# Patient Record
Sex: MALE | Race: WHITE
[De-identification: names, ages, dates, MRNs, and addresses within clinical notes are randomized per-mention and may not be internally consistent; named-entity substitution may affect disease eponyms.]

---

## 2018-07-23 ENCOUNTER — HOSPITAL ENCOUNTER (EMERGENCY)
Dept: HOSPITAL 84 - D.ER | Age: 63
Discharge: HOME | End: 2018-07-23
Payer: MEDICARE

## 2018-07-23 VITALS
WEIGHT: 180.38 LBS | WEIGHT: 180.38 LBS | BODY MASS INDEX: 26.72 KG/M2 | HEIGHT: 69 IN | HEIGHT: 69 IN | BODY MASS INDEX: 26.72 KG/M2

## 2018-07-23 VITALS — DIASTOLIC BLOOD PRESSURE: 69 MMHG | SYSTOLIC BLOOD PRESSURE: 127 MMHG

## 2018-07-23 DIAGNOSIS — I10: ICD-10-CM

## 2018-07-23 DIAGNOSIS — Z89.511: ICD-10-CM

## 2018-07-23 DIAGNOSIS — E11.621: Primary | ICD-10-CM

## 2018-07-23 DIAGNOSIS — L03.116: ICD-10-CM

## 2018-07-23 LAB
ALBUMIN SERPL-MCNC: 3.6 G/DL (ref 3.4–5)
ALP SERPL-CCNC: 86 U/L (ref 46–116)
ALT SERPL-CCNC: 33 U/L (ref 10–68)
ANION GAP SERPL CALC-SCNC: 8.7 MMOL/L (ref 8–16)
BASOPHILS NFR BLD AUTO: 0.5 % (ref 0–2)
BILIRUB SERPL-MCNC: 0.96 MG/DL (ref 0.2–1.3)
BUN SERPL-MCNC: 16 MG/DL (ref 7–18)
CALCIUM SERPL-MCNC: 9 MG/DL (ref 8.5–10.1)
CHLORIDE SERPL-SCNC: 101 MMOL/L (ref 98–107)
CO2 SERPL-SCNC: 31.4 MMOL/L (ref 21–32)
CREAT SERPL-MCNC: 1.2 MG/DL (ref 0.6–1.3)
EOSINOPHIL NFR BLD: 2 % (ref 0–7)
ERYTHROCYTE [DISTWIDTH] IN BLOOD BY AUTOMATED COUNT: 13.1 % (ref 11.5–14.5)
GLOBULIN SER-MCNC: 4.5 G/L
GLUCOSE SERPL-MCNC: 245 MG/DL (ref 74–106)
HCT VFR BLD CALC: 38.6 % (ref 42–54)
HGB BLD-MCNC: 13.9 G/DL (ref 13.5–17.5)
IMM GRANULOCYTES NFR BLD: 0.2 % (ref 0–5)
LYMPHOCYTES NFR BLD AUTO: 18 % (ref 15–50)
MCH RBC QN AUTO: 33.9 PG (ref 26–34)
MCHC RBC AUTO-ENTMCNC: 36 G/DL (ref 31–37)
MCV RBC: 94.1 FL (ref 80–100)
MONOCYTES NFR BLD: 6.8 % (ref 2–11)
NEUTROPHILS NFR BLD AUTO: 72.5 % (ref 40–80)
OSMOLALITY SERPL CALC.SUM OF ELEC: 282 MOSM/KG (ref 275–300)
PLATELET # BLD: 174 10X3/UL (ref 130–400)
PMV BLD AUTO: 11 FL (ref 7.4–10.4)
POTASSIUM SERPL-SCNC: 4.1 MMOL/L (ref 3.5–5.1)
PROT SERPL-MCNC: 8.1 G/DL (ref 6.4–8.2)
RBC # BLD AUTO: 4.1 10X6/UL (ref 4.2–6.1)
SODIUM SERPL-SCNC: 137 MMOL/L (ref 136–145)
WBC # BLD AUTO: 10.2 10X3/UL (ref 4.8–10.8)

## 2018-08-13 ENCOUNTER — HOSPITAL ENCOUNTER (INPATIENT)
Dept: HOSPITAL 84 - D.ER | Age: 63
LOS: 3 days | Discharge: HOME | DRG: 253 | End: 2018-08-16
Attending: INTERNAL MEDICINE | Admitting: INTERNAL MEDICINE
Payer: MEDICARE

## 2018-08-13 VITALS — DIASTOLIC BLOOD PRESSURE: 77 MMHG | SYSTOLIC BLOOD PRESSURE: 135 MMHG

## 2018-08-13 VITALS
HEIGHT: 69 IN | WEIGHT: 176.57 LBS | BODY MASS INDEX: 26.15 KG/M2 | BODY MASS INDEX: 26.15 KG/M2 | HEIGHT: 69 IN | WEIGHT: 176.57 LBS | BODY MASS INDEX: 26.15 KG/M2

## 2018-08-13 VITALS — DIASTOLIC BLOOD PRESSURE: 71 MMHG | SYSTOLIC BLOOD PRESSURE: 138 MMHG

## 2018-08-13 VITALS — SYSTOLIC BLOOD PRESSURE: 126 MMHG | DIASTOLIC BLOOD PRESSURE: 74 MMHG

## 2018-08-13 DIAGNOSIS — Q85.00: ICD-10-CM

## 2018-08-13 DIAGNOSIS — L97.421: ICD-10-CM

## 2018-08-13 DIAGNOSIS — I25.10: ICD-10-CM

## 2018-08-13 DIAGNOSIS — E11.621: ICD-10-CM

## 2018-08-13 DIAGNOSIS — E10.51: Primary | ICD-10-CM

## 2018-08-13 DIAGNOSIS — I70.244: ICD-10-CM

## 2018-08-13 DIAGNOSIS — I70.92: ICD-10-CM

## 2018-08-13 DIAGNOSIS — I10: ICD-10-CM

## 2018-08-13 LAB
ALBUMIN SERPL-MCNC: 2.9 G/DL (ref 3.4–5)
ALP SERPL-CCNC: 82 U/L (ref 46–116)
ALT SERPL-CCNC: 21 U/L (ref 10–68)
ANION GAP SERPL CALC-SCNC: 12.3 MMOL/L (ref 8–16)
BASOPHILS NFR BLD AUTO: 0.4 % (ref 0–2)
BILIRUB SERPL-MCNC: 0.53 MG/DL (ref 0.2–1.3)
BUN SERPL-MCNC: 17 MG/DL (ref 7–18)
CALCIUM SERPL-MCNC: 8.9 MG/DL (ref 8.5–10.1)
CHLORIDE SERPL-SCNC: 101 MMOL/L (ref 98–107)
CO2 SERPL-SCNC: 26.6 MMOL/L (ref 21–32)
CREAT SERPL-MCNC: 1.2 MG/DL (ref 0.6–1.3)
EOSINOPHIL NFR BLD: 0.6 % (ref 0–7)
ERYTHROCYTE [DISTWIDTH] IN BLOOD BY AUTOMATED COUNT: 12.7 % (ref 11.5–14.5)
EST. AVERAGE GLUCOSE BLD GHB EST-MCNC: 194 MG/DL (ref 74–154)
GLOBULIN SER-MCNC: 5.5 G/L
GLUCOSE SERPL-MCNC: 242 MG/DL (ref 74–106)
HCT VFR BLD CALC: 35.1 % (ref 42–54)
HGB BLD-MCNC: 12.1 G/DL (ref 13.5–17.5)
IMM GRANULOCYTES NFR BLD: 0.2 % (ref 0–5)
LYMPHOCYTES NFR BLD AUTO: 14.2 % (ref 15–50)
MCH RBC QN AUTO: 43.6 PG (ref 26–34)
MCHC RBC AUTO-ENTMCNC: 45.9 G/DL (ref 31–37)
MCV RBC: 95.1 FL (ref 80–100)
MONOCYTES NFR BLD: 7.3 % (ref 2–11)
NEUTROPHILS NFR BLD AUTO: 77.3 % (ref 40–80)
OSMOLALITY SERPL CALC.SUM OF ELEC: 281 MOSM/KG (ref 275–300)
PLATELET # BLD: 260 10X3/UL (ref 130–400)
PMV BLD AUTO: 9.9 FL (ref 7.4–10.4)
POTASSIUM SERPL-SCNC: 3.9 MMOL/L (ref 3.5–5.1)
PROT SERPL-MCNC: 8.4 G/DL (ref 6.4–8.2)
RBC # BLD AUTO: 3.69 10X6/UL (ref 4.2–6.1)
SODIUM SERPL-SCNC: 136 MMOL/L (ref 136–145)
WBC # BLD AUTO: 14.1 10X3/UL (ref 4.8–10.8)

## 2018-08-14 VITALS — DIASTOLIC BLOOD PRESSURE: 72 MMHG | SYSTOLIC BLOOD PRESSURE: 151 MMHG

## 2018-08-14 VITALS — DIASTOLIC BLOOD PRESSURE: 75 MMHG | SYSTOLIC BLOOD PRESSURE: 154 MMHG

## 2018-08-14 VITALS — DIASTOLIC BLOOD PRESSURE: 81 MMHG | SYSTOLIC BLOOD PRESSURE: 167 MMHG

## 2018-08-14 VITALS — DIASTOLIC BLOOD PRESSURE: 74 MMHG | SYSTOLIC BLOOD PRESSURE: 147 MMHG

## 2018-08-14 VITALS — DIASTOLIC BLOOD PRESSURE: 73 MMHG | SYSTOLIC BLOOD PRESSURE: 133 MMHG

## 2018-08-14 LAB
ANION GAP SERPL CALC-SCNC: 11 MMOL/L (ref 8–16)
BASOPHILS NFR BLD AUTO: 0.2 % (ref 0–2)
BUN SERPL-MCNC: 15 MG/DL (ref 7–18)
CALCIUM SERPL-MCNC: 8.3 MG/DL (ref 8.5–10.1)
CHLORIDE SERPL-SCNC: 104 MMOL/L (ref 98–107)
CO2 SERPL-SCNC: 26.7 MMOL/L (ref 21–32)
CREAT SERPL-MCNC: 1.3 MG/DL (ref 0.6–1.3)
EOSINOPHIL NFR BLD: 1 % (ref 0–7)
ERYTHROCYTE [DISTWIDTH] IN BLOOD BY AUTOMATED COUNT: 12.9 % (ref 11.5–14.5)
GLUCOSE SERPL-MCNC: 162 MG/DL (ref 74–106)
HCT VFR BLD CALC: 32.6 % (ref 42–54)
HGB BLD-MCNC: 11.2 G/DL (ref 13.5–17.5)
IMM GRANULOCYTES NFR BLD: 0.2 % (ref 0–5)
LYMPHOCYTES NFR BLD AUTO: 13.4 % (ref 15–50)
MCH RBC QN AUTO: 32.7 PG (ref 26–34)
MCHC RBC AUTO-ENTMCNC: 34.4 G/DL (ref 31–37)
MCV RBC: 95.3 FL (ref 80–100)
MONOCYTES NFR BLD: 8.6 % (ref 2–11)
NEUTROPHILS NFR BLD AUTO: 76.6 % (ref 40–80)
OSMOLALITY SERPL CALC.SUM OF ELEC: 280 MOSM/KG (ref 275–300)
PLATELET # BLD: 249 10X3/UL (ref 130–400)
PMV BLD AUTO: 9.8 FL (ref 7.4–10.4)
POTASSIUM SERPL-SCNC: 3.7 MMOL/L (ref 3.5–5.1)
RBC # BLD AUTO: 3.42 10X6/UL (ref 4.2–6.1)
SODIUM SERPL-SCNC: 138 MMOL/L (ref 136–145)
WBC # BLD AUTO: 12.4 10X3/UL (ref 4.8–10.8)

## 2018-08-15 VITALS — DIASTOLIC BLOOD PRESSURE: 82 MMHG | SYSTOLIC BLOOD PRESSURE: 147 MMHG

## 2018-08-15 VITALS — SYSTOLIC BLOOD PRESSURE: 134 MMHG | DIASTOLIC BLOOD PRESSURE: 74 MMHG

## 2018-08-15 LAB
ANION GAP SERPL CALC-SCNC: 12.1 MMOL/L (ref 8–16)
BASOPHILS NFR BLD AUTO: 0.3 % (ref 0–2)
BUN SERPL-MCNC: 16 MG/DL (ref 7–18)
CALCIUM SERPL-MCNC: 8.2 MG/DL (ref 8.5–10.1)
CHLORIDE SERPL-SCNC: 101 MMOL/L (ref 98–107)
CO2 SERPL-SCNC: 27 MMOL/L (ref 21–32)
CREAT SERPL-MCNC: 1.1 MG/DL (ref 0.6–1.3)
EOSINOPHIL NFR BLD: 1.2 % (ref 0–7)
ERYTHROCYTE [DISTWIDTH] IN BLOOD BY AUTOMATED COUNT: 12.9 % (ref 11.5–14.5)
GLUCOSE SERPL-MCNC: 261 MG/DL (ref 74–106)
HCT VFR BLD CALC: 33 % (ref 42–54)
HGB BLD-MCNC: 11.3 G/DL (ref 13.5–17.5)
IMM GRANULOCYTES NFR BLD: 0.3 % (ref 0–5)
LYMPHOCYTES NFR BLD AUTO: 13.7 % (ref 15–50)
MCH RBC QN AUTO: 32.6 PG (ref 26–34)
MCHC RBC AUTO-ENTMCNC: 34.2 G/DL (ref 31–37)
MCV RBC: 95.1 FL (ref 80–100)
MONOCYTES NFR BLD: 7.5 % (ref 2–11)
NEUTROPHILS NFR BLD AUTO: 77 % (ref 40–80)
OSMOLALITY SERPL CALC.SUM OF ELEC: 281 MOSM/KG (ref 275–300)
PLATELET # BLD: 263 10X3/UL (ref 130–400)
PMV BLD AUTO: 9.6 FL (ref 7.4–10.4)
POTASSIUM SERPL-SCNC: 4.1 MMOL/L (ref 3.5–5.1)
RBC # BLD AUTO: 3.47 10X6/UL (ref 4.2–6.1)
SODIUM SERPL-SCNC: 136 MMOL/L (ref 136–145)
WBC # BLD AUTO: 11.8 10X3/UL (ref 4.8–10.8)

## 2018-08-15 PROCEDURE — 047N3DZ DILATION OF LEFT POPLITEAL ARTERY WITH INTRALUMINAL DEVICE, PERCUTANEOUS APPROACH: ICD-10-PCS | Performed by: INTERNAL MEDICINE

## 2018-08-16 VITALS — DIASTOLIC BLOOD PRESSURE: 75 MMHG | SYSTOLIC BLOOD PRESSURE: 147 MMHG

## 2018-08-16 VITALS — DIASTOLIC BLOOD PRESSURE: 77 MMHG | SYSTOLIC BLOOD PRESSURE: 156 MMHG

## 2018-08-16 VITALS — SYSTOLIC BLOOD PRESSURE: 146 MMHG | DIASTOLIC BLOOD PRESSURE: 60 MMHG

## 2018-08-16 LAB
ANION GAP SERPL CALC-SCNC: 13.4 MMOL/L (ref 8–16)
BASOPHILS NFR BLD AUTO: 0.2 % (ref 0–2)
BUN SERPL-MCNC: 14 MG/DL (ref 7–18)
CALCIUM SERPL-MCNC: 8.2 MG/DL (ref 8.5–10.1)
CHLORIDE SERPL-SCNC: 104 MMOL/L (ref 98–107)
CO2 SERPL-SCNC: 25.3 MMOL/L (ref 21–32)
CREAT SERPL-MCNC: 1 MG/DL (ref 0.6–1.3)
EOSINOPHIL NFR BLD: 1 % (ref 0–7)
ERYTHROCYTE [DISTWIDTH] IN BLOOD BY AUTOMATED COUNT: 12.7 % (ref 11.5–14.5)
GLUCOSE SERPL-MCNC: 235 MG/DL (ref 74–106)
HCT VFR BLD CALC: 34.1 % (ref 42–54)
HGB BLD-MCNC: 11.6 G/DL (ref 13.5–17.5)
IMM GRANULOCYTES NFR BLD: 0.2 % (ref 0–5)
LYMPHOCYTES NFR BLD AUTO: 15.6 % (ref 15–50)
MCH RBC QN AUTO: 32.2 PG (ref 26–34)
MCHC RBC AUTO-ENTMCNC: 34 G/DL (ref 31–37)
MCV RBC: 94.7 FL (ref 80–100)
MONOCYTES NFR BLD: 8.2 % (ref 2–11)
NEUTROPHILS NFR BLD AUTO: 74.8 % (ref 40–80)
OSMOLALITY SERPL CALC.SUM OF ELEC: 286 MOSM/KG (ref 275–300)
PLATELET # BLD: 276 10X3/UL (ref 130–400)
PMV BLD AUTO: 9.8 FL (ref 7.4–10.4)
POTASSIUM SERPL-SCNC: 3.7 MMOL/L (ref 3.5–5.1)
RBC # BLD AUTO: 3.6 10X6/UL (ref 4.2–6.1)
SODIUM SERPL-SCNC: 139 MMOL/L (ref 136–145)
WBC # BLD AUTO: 12.1 10X3/UL (ref 4.8–10.8)

## 2018-11-26 ENCOUNTER — HOSPITAL ENCOUNTER (INPATIENT)
Dept: HOSPITAL 84 - D.M2 | Age: 63
LOS: 7 days | Discharge: HOME | DRG: 981 | End: 2018-12-03
Attending: PODIATRIST | Admitting: PODIATRIST
Payer: MEDICARE

## 2018-11-26 VITALS — SYSTOLIC BLOOD PRESSURE: 152 MMHG | DIASTOLIC BLOOD PRESSURE: 76 MMHG

## 2018-11-26 VITALS
HEIGHT: 69 IN | BODY MASS INDEX: 25.83 KG/M2 | BODY MASS INDEX: 25.83 KG/M2 | BODY MASS INDEX: 25.83 KG/M2 | WEIGHT: 174.37 LBS | WEIGHT: 174.37 LBS | HEIGHT: 69 IN

## 2018-11-26 VITALS — SYSTOLIC BLOOD PRESSURE: 97 MMHG | DIASTOLIC BLOOD PRESSURE: 54 MMHG

## 2018-11-26 DIAGNOSIS — E43: ICD-10-CM

## 2018-11-26 DIAGNOSIS — E11.42: ICD-10-CM

## 2018-11-26 DIAGNOSIS — N40.0: ICD-10-CM

## 2018-11-26 DIAGNOSIS — I25.119: ICD-10-CM

## 2018-11-26 DIAGNOSIS — M86.172: ICD-10-CM

## 2018-11-26 DIAGNOSIS — E11.51: ICD-10-CM

## 2018-11-26 DIAGNOSIS — I10: ICD-10-CM

## 2018-11-26 DIAGNOSIS — E11.69: Primary | ICD-10-CM

## 2018-11-27 VITALS — SYSTOLIC BLOOD PRESSURE: 133 MMHG | DIASTOLIC BLOOD PRESSURE: 74 MMHG

## 2018-11-27 VITALS — SYSTOLIC BLOOD PRESSURE: 124 MMHG | DIASTOLIC BLOOD PRESSURE: 57 MMHG

## 2018-11-27 VITALS — SYSTOLIC BLOOD PRESSURE: 136 MMHG | DIASTOLIC BLOOD PRESSURE: 75 MMHG

## 2018-11-27 VITALS — SYSTOLIC BLOOD PRESSURE: 123 MMHG | DIASTOLIC BLOOD PRESSURE: 75 MMHG

## 2018-11-27 VITALS — DIASTOLIC BLOOD PRESSURE: 65 MMHG | SYSTOLIC BLOOD PRESSURE: 152 MMHG

## 2018-11-27 LAB
ALBUMIN SERPL-MCNC: 2.1 G/DL (ref 3.4–5)
ALP SERPL-CCNC: 78 U/L (ref 46–116)
ALT SERPL-CCNC: 18 U/L (ref 10–68)
ANION GAP SERPL CALC-SCNC: 10.8 MMOL/L (ref 8–16)
BASOPHILS NFR BLD AUTO: 0.4 % (ref 0–2)
BILIRUB SERPL-MCNC: 0.39 MG/DL (ref 0.2–1.3)
BUN SERPL-MCNC: 7 MG/DL (ref 7–18)
CALCIUM SERPL-MCNC: 8.9 MG/DL (ref 8.5–10.1)
CHLORIDE SERPL-SCNC: 100 MMOL/L (ref 98–107)
CO2 SERPL-SCNC: 28.8 MMOL/L (ref 21–32)
CREAT SERPL-MCNC: 1 MG/DL (ref 0.6–1.3)
EOSINOPHIL NFR BLD: 1.7 % (ref 0–7)
ERYTHROCYTE [DISTWIDTH] IN BLOOD BY AUTOMATED COUNT: 14 % (ref 11.5–14.5)
GLOBULIN SER-MCNC: 5.9 G/L
GLUCOSE SERPL-MCNC: 186 MG/DL (ref 74–106)
HCT VFR BLD CALC: 29.7 % (ref 42–54)
HGB BLD-MCNC: 9.5 G/DL (ref 13.5–17.5)
IMM GRANULOCYTES NFR BLD: 0.2 % (ref 0–5)
LYMPHOCYTES NFR BLD AUTO: 25.1 % (ref 15–50)
MAGNESIUM SERPL-MCNC: 2.2 MG/DL (ref 1.8–2.4)
MCH RBC QN AUTO: 26.6 PG (ref 26–34)
MCHC RBC AUTO-ENTMCNC: 32 G/DL (ref 31–37)
MCV RBC: 83.2 FL (ref 80–100)
MONOCYTES NFR BLD: 11.4 % (ref 2–11)
NEUTROPHILS NFR BLD AUTO: 61.2 % (ref 40–80)
OSMOLALITY SERPL CALC.SUM OF ELEC: 274 MOSM/KG (ref 275–300)
PLATELET # BLD: 397 10X3/UL (ref 130–400)
PMV BLD AUTO: 8.9 FL (ref 7.4–10.4)
POTASSIUM SERPL-SCNC: 3.6 MMOL/L (ref 3.5–5.1)
PROT SERPL-MCNC: 8 G/DL (ref 6.4–8.2)
RBC # BLD AUTO: 3.57 10X6/UL (ref 4.2–6.1)
SODIUM SERPL-SCNC: 136 MMOL/L (ref 136–145)
WBC # BLD AUTO: 8.2 10X3/UL (ref 4.8–10.8)

## 2018-11-28 VITALS — DIASTOLIC BLOOD PRESSURE: 67 MMHG | SYSTOLIC BLOOD PRESSURE: 108 MMHG

## 2018-11-28 VITALS — SYSTOLIC BLOOD PRESSURE: 109 MMHG | DIASTOLIC BLOOD PRESSURE: 62 MMHG

## 2018-11-28 VITALS — DIASTOLIC BLOOD PRESSURE: 68 MMHG | SYSTOLIC BLOOD PRESSURE: 126 MMHG

## 2018-11-28 VITALS — DIASTOLIC BLOOD PRESSURE: 74 MMHG | SYSTOLIC BLOOD PRESSURE: 140 MMHG

## 2018-11-28 VITALS — DIASTOLIC BLOOD PRESSURE: 71 MMHG | SYSTOLIC BLOOD PRESSURE: 142 MMHG

## 2018-11-28 VITALS — DIASTOLIC BLOOD PRESSURE: 71 MMHG | SYSTOLIC BLOOD PRESSURE: 117 MMHG

## 2018-11-28 LAB
ALBUMIN SERPL-MCNC: 2.1 G/DL (ref 3.4–5)
ALP SERPL-CCNC: 78 U/L (ref 46–116)
ALT SERPL-CCNC: 16 U/L (ref 10–68)
ANION GAP SERPL CALC-SCNC: 9.4 MMOL/L (ref 8–16)
BASOPHILS NFR BLD AUTO: 0.4 % (ref 0–2)
BILIRUB SERPL-MCNC: 0.36 MG/DL (ref 0.2–1.3)
BUN SERPL-MCNC: 9 MG/DL (ref 7–18)
CALCIUM SERPL-MCNC: 8.7 MG/DL (ref 8.5–10.1)
CHLORIDE SERPL-SCNC: 99 MMOL/L (ref 98–107)
CO2 SERPL-SCNC: 29.3 MMOL/L (ref 21–32)
CREAT SERPL-MCNC: 1.1 MG/DL (ref 0.6–1.3)
CRP SERPL-MCNC: 14.8 MG/DL (ref 0–0.9)
EOSINOPHIL NFR BLD: 2.4 % (ref 0–7)
ERYTHROCYTE [DISTWIDTH] IN BLOOD BY AUTOMATED COUNT: 14.1 % (ref 11.5–14.5)
ERYTHROCYTE [SEDIMENTATION RATE] IN BLOOD: 131 MM/HR (ref 0–20)
GLOBULIN SER-MCNC: 6.1 G/L
GLUCOSE SERPL-MCNC: 267 MG/DL (ref 74–106)
HCT VFR BLD CALC: 30.2 % (ref 42–54)
HGB BLD-MCNC: 9.7 G/DL (ref 13.5–17.5)
IMM GRANULOCYTES NFR BLD: 0.2 % (ref 0–5)
LYMPHOCYTES NFR BLD AUTO: 24.2 % (ref 15–50)
MAGNESIUM SERPL-MCNC: 2 MG/DL (ref 1.8–2.4)
MCH RBC QN AUTO: 26.6 PG (ref 26–34)
MCHC RBC AUTO-ENTMCNC: 32.1 G/DL (ref 31–37)
MCV RBC: 83 FL (ref 80–100)
MONOCYTES NFR BLD: 8.5 % (ref 2–11)
NEUTROPHILS NFR BLD AUTO: 64.3 % (ref 40–80)
OSMOLALITY SERPL CALC.SUM OF ELEC: 275 MOSM/KG (ref 275–300)
PLATELET # BLD: 375 10X3/UL (ref 130–400)
PMV BLD AUTO: 8.8 FL (ref 7.4–10.4)
POTASSIUM SERPL-SCNC: 3.7 MMOL/L (ref 3.5–5.1)
PROT SERPL-MCNC: 8.2 G/DL (ref 6.4–8.2)
RBC # BLD AUTO: 3.64 10X6/UL (ref 4.2–6.1)
SODIUM SERPL-SCNC: 134 MMOL/L (ref 136–145)
WBC # BLD AUTO: 8.3 10X3/UL (ref 4.8–10.8)

## 2018-11-28 PROCEDURE — 4A023N7 MEASUREMENT OF CARDIAC SAMPLING AND PRESSURE, LEFT HEART, PERCUTANEOUS APPROACH: ICD-10-PCS | Performed by: INTERNAL MEDICINE

## 2018-11-28 PROCEDURE — 02703DZ DILATION OF CORONARY ARTERY, ONE ARTERY WITH INTRALUMINAL DEVICE, PERCUTANEOUS APPROACH: ICD-10-PCS | Performed by: INTERNAL MEDICINE

## 2018-11-28 PROCEDURE — B31G1ZZ FLUOROSCOPY OF BILATERAL VERTEBRAL ARTERIES USING LOW OSMOLAR CONTRAST: ICD-10-PCS | Performed by: INTERNAL MEDICINE

## 2018-11-28 PROCEDURE — B4101ZZ FLUOROSCOPY OF ABDOMINAL AORTA USING LOW OSMOLAR CONTRAST: ICD-10-PCS | Performed by: INTERNAL MEDICINE

## 2018-11-28 PROCEDURE — B2151ZZ FLUOROSCOPY OF LEFT HEART USING LOW OSMOLAR CONTRAST: ICD-10-PCS | Performed by: INTERNAL MEDICINE

## 2018-11-28 PROCEDURE — B2111ZZ FLUOROSCOPY OF MULTIPLE CORONARY ARTERIES USING LOW OSMOLAR CONTRAST: ICD-10-PCS | Performed by: INTERNAL MEDICINE

## 2018-11-28 PROCEDURE — B3151ZZ FLUOROSCOPY OF BILATERAL COMMON CAROTID ARTERIES USING LOW OSMOLAR CONTRAST: ICD-10-PCS | Performed by: INTERNAL MEDICINE

## 2018-11-28 PROCEDURE — B3121ZZ FLUOROSCOPY OF LEFT SUBCLAVIAN ARTERY USING LOW OSMOLAR CONTRAST: ICD-10-PCS | Performed by: INTERNAL MEDICINE

## 2018-11-29 VITALS — SYSTOLIC BLOOD PRESSURE: 137 MMHG | DIASTOLIC BLOOD PRESSURE: 74 MMHG

## 2018-11-29 VITALS — SYSTOLIC BLOOD PRESSURE: 128 MMHG | DIASTOLIC BLOOD PRESSURE: 60 MMHG

## 2018-11-29 VITALS — DIASTOLIC BLOOD PRESSURE: 65 MMHG | SYSTOLIC BLOOD PRESSURE: 146 MMHG

## 2018-11-29 VITALS — DIASTOLIC BLOOD PRESSURE: 63 MMHG | SYSTOLIC BLOOD PRESSURE: 106 MMHG

## 2018-11-29 VITALS — SYSTOLIC BLOOD PRESSURE: 137 MMHG | DIASTOLIC BLOOD PRESSURE: 63 MMHG

## 2018-11-29 VITALS — DIASTOLIC BLOOD PRESSURE: 47 MMHG | SYSTOLIC BLOOD PRESSURE: 97 MMHG

## 2018-11-29 VITALS — SYSTOLIC BLOOD PRESSURE: 92 MMHG | DIASTOLIC BLOOD PRESSURE: 56 MMHG

## 2018-11-29 LAB
ALBUMIN SERPL-MCNC: 2.1 G/DL (ref 3.4–5)
ALP SERPL-CCNC: 77 U/L (ref 46–116)
ALT SERPL-CCNC: 14 U/L (ref 10–68)
ANION GAP SERPL CALC-SCNC: 10.4 MMOL/L (ref 8–16)
BASOPHILS NFR BLD AUTO: 0.4 % (ref 0–2)
BASOPHILS NFR BLD AUTO: 0.8 % (ref 0–2)
BILIRUB SERPL-MCNC: 0.4 MG/DL (ref 0.2–1.3)
BUN SERPL-MCNC: 10 MG/DL (ref 7–18)
CALCIUM SERPL-MCNC: 8.5 MG/DL (ref 8.5–10.1)
CHLORIDE SERPL-SCNC: 99 MMOL/L (ref 98–107)
CO2 SERPL-SCNC: 28.8 MMOL/L (ref 21–32)
CREAT SERPL-MCNC: 1.1 MG/DL (ref 0.6–1.3)
EOSINOPHIL NFR BLD: 1.8 % (ref 0–7)
EOSINOPHIL NFR BLD: 3 % (ref 0–7)
ERYTHROCYTE [DISTWIDTH] IN BLOOD BY AUTOMATED COUNT: 14 % (ref 11.5–14.5)
ERYTHROCYTE [DISTWIDTH] IN BLOOD BY AUTOMATED COUNT: 14.1 % (ref 11.5–14.5)
GLOBULIN SER-MCNC: 6.2 G/L
GLUCOSE SERPL-MCNC: 362 MG/DL (ref 74–106)
HCT VFR BLD CALC: 24.7 % (ref 42–54)
HCT VFR BLD CALC: 30.7 % (ref 42–54)
HGB BLD-MCNC: 7.8 G/DL (ref 13.5–17.5)
HGB BLD-MCNC: 9.8 G/DL (ref 13.5–17.5)
IMM GRANULOCYTES NFR BLD: 0.2 % (ref 0–5)
IMM GRANULOCYTES NFR BLD: 0.2 % (ref 0–5)
LYMPHOCYTES NFR BLD AUTO: 15.6 % (ref 15–50)
LYMPHOCYTES NFR BLD AUTO: 28.5 % (ref 15–50)
MAGNESIUM SERPL-MCNC: 1.9 MG/DL (ref 1.8–2.4)
MCH RBC QN AUTO: 26.2 PG (ref 26–34)
MCH RBC QN AUTO: 26.5 PG (ref 26–34)
MCHC RBC AUTO-ENTMCNC: 31.6 G/DL (ref 31–37)
MCHC RBC AUTO-ENTMCNC: 31.9 G/DL (ref 31–37)
MCV RBC: 82.9 FL (ref 80–100)
MCV RBC: 83 FL (ref 80–100)
MONOCYTES NFR BLD: 6.6 % (ref 2–11)
MONOCYTES NFR BLD: 7.2 % (ref 2–11)
NEUTROPHILS NFR BLD AUTO: 60.9 % (ref 40–80)
NEUTROPHILS NFR BLD AUTO: 74.8 % (ref 40–80)
OSMOLALITY SERPL CALC.SUM OF ELEC: 281 MOSM/KG (ref 275–300)
PLATELET # BLD: 343 10X3/UL (ref 130–400)
PLATELET # BLD: 380 10X3/UL (ref 130–400)
PMV BLD AUTO: 8.7 FL (ref 7.4–10.4)
PMV BLD AUTO: 8.8 FL (ref 7.4–10.4)
POTASSIUM SERPL-SCNC: 4.2 MMOL/L (ref 3.5–5.1)
PROT SERPL-MCNC: 8.3 G/DL (ref 6.4–8.2)
RBC # BLD AUTO: 2.98 10X6/UL (ref 4.2–6.1)
RBC # BLD AUTO: 3.7 10X6/UL (ref 4.2–6.1)
SODIUM SERPL-SCNC: 134 MMOL/L (ref 136–145)
WBC # BLD AUTO: 10.9 10X3/UL (ref 4.8–10.8)
WBC # BLD AUTO: 6.6 10X3/UL (ref 4.8–10.8)

## 2018-11-29 PROCEDURE — 0H9NXZZ DRAINAGE OF LEFT FOOT SKIN, EXTERNAL APPROACH: ICD-10-PCS | Performed by: PODIATRIST

## 2018-11-30 VITALS — DIASTOLIC BLOOD PRESSURE: 69 MMHG | SYSTOLIC BLOOD PRESSURE: 113 MMHG

## 2018-11-30 VITALS — SYSTOLIC BLOOD PRESSURE: 117 MMHG | DIASTOLIC BLOOD PRESSURE: 61 MMHG

## 2018-11-30 VITALS — SYSTOLIC BLOOD PRESSURE: 101 MMHG | DIASTOLIC BLOOD PRESSURE: 55 MMHG

## 2018-11-30 VITALS — DIASTOLIC BLOOD PRESSURE: 65 MMHG | SYSTOLIC BLOOD PRESSURE: 106 MMHG

## 2018-11-30 VITALS — SYSTOLIC BLOOD PRESSURE: 132 MMHG | DIASTOLIC BLOOD PRESSURE: 68 MMHG

## 2018-11-30 VITALS — DIASTOLIC BLOOD PRESSURE: 66 MMHG | SYSTOLIC BLOOD PRESSURE: 118 MMHG

## 2018-11-30 LAB
ALBUMIN SERPL-MCNC: 1.9 G/DL (ref 3.4–5)
ALP SERPL-CCNC: 65 U/L (ref 46–116)
ALT SERPL-CCNC: 10 U/L (ref 10–68)
ANION GAP SERPL CALC-SCNC: 12.5 MMOL/L (ref 8–16)
BASOPHILS NFR BLD AUTO: 0.3 % (ref 0–2)
BILIRUB SERPL-MCNC: 0.72 MG/DL (ref 0.2–1.3)
BUN SERPL-MCNC: 11 MG/DL (ref 7–18)
CALCIUM SERPL-MCNC: 8 MG/DL (ref 8.5–10.1)
CHLORIDE SERPL-SCNC: 102 MMOL/L (ref 98–107)
CO2 SERPL-SCNC: 24.7 MMOL/L (ref 21–32)
CREAT SERPL-MCNC: 1.1 MG/DL (ref 0.6–1.3)
EOSINOPHIL NFR BLD: 2 % (ref 0–7)
ERYTHROCYTE [DISTWIDTH] IN BLOOD BY AUTOMATED COUNT: 14 % (ref 11.5–14.5)
GLOBULIN SER-MCNC: 5.1 G/L
GLUCOSE SERPL-MCNC: 276 MG/DL (ref 74–106)
HCT VFR BLD CALC: 27.1 % (ref 42–54)
HGB BLD-MCNC: 8.7 G/DL (ref 13.5–17.5)
IMM GRANULOCYTES NFR BLD: 0.1 % (ref 0–5)
LYMPHOCYTES NFR BLD AUTO: 17 % (ref 15–50)
MAGNESIUM SERPL-MCNC: 1.8 MG/DL (ref 1.8–2.4)
MCH RBC QN AUTO: 26.9 PG (ref 26–34)
MCHC RBC AUTO-ENTMCNC: 32.1 G/DL (ref 31–37)
MCV RBC: 83.9 FL (ref 80–100)
MONOCYTES NFR BLD: 7.7 % (ref 2–11)
NEUTROPHILS NFR BLD AUTO: 72.9 % (ref 40–80)
OSMOLALITY SERPL CALC.SUM OF ELEC: 278 MOSM/KG (ref 275–300)
PLATELET # BLD: 335 10X3/UL (ref 130–400)
PMV BLD AUTO: 9 FL (ref 7.4–10.4)
POTASSIUM SERPL-SCNC: 4.2 MMOL/L (ref 3.5–5.1)
PROT SERPL-MCNC: 7 G/DL (ref 6.4–8.2)
RBC # BLD AUTO: 3.23 10X6/UL (ref 4.2–6.1)
SODIUM SERPL-SCNC: 135 MMOL/L (ref 136–145)
WBC # BLD AUTO: 9.7 10X3/UL (ref 4.8–10.8)

## 2018-12-01 VITALS — DIASTOLIC BLOOD PRESSURE: 61 MMHG | SYSTOLIC BLOOD PRESSURE: 107 MMHG

## 2018-12-01 VITALS — SYSTOLIC BLOOD PRESSURE: 124 MMHG | DIASTOLIC BLOOD PRESSURE: 67 MMHG

## 2018-12-01 VITALS — DIASTOLIC BLOOD PRESSURE: 67 MMHG | SYSTOLIC BLOOD PRESSURE: 139 MMHG

## 2018-12-01 VITALS — DIASTOLIC BLOOD PRESSURE: 68 MMHG | SYSTOLIC BLOOD PRESSURE: 138 MMHG

## 2018-12-01 VITALS — DIASTOLIC BLOOD PRESSURE: 73 MMHG | SYSTOLIC BLOOD PRESSURE: 133 MMHG

## 2018-12-01 LAB
ALBUMIN SERPL-MCNC: 1.9 G/DL (ref 3.4–5)
ALP SERPL-CCNC: 72 U/L (ref 46–116)
ALT SERPL-CCNC: 13 U/L (ref 10–68)
ANION GAP SERPL CALC-SCNC: 12.8 MMOL/L (ref 8–16)
BASOPHILS NFR BLD AUTO: 0.4 % (ref 0–2)
BILIRUB SERPL-MCNC: 0.35 MG/DL (ref 0.2–1.3)
BUN SERPL-MCNC: 8 MG/DL (ref 7–18)
CALCIUM SERPL-MCNC: 8.3 MG/DL (ref 8.5–10.1)
CHLORIDE SERPL-SCNC: 101 MMOL/L (ref 98–107)
CO2 SERPL-SCNC: 26.2 MMOL/L (ref 21–32)
CREAT SERPL-MCNC: 1 MG/DL (ref 0.6–1.3)
EOSINOPHIL NFR BLD: 2.7 % (ref 0–7)
ERYTHROCYTE [DISTWIDTH] IN BLOOD BY AUTOMATED COUNT: 14 % (ref 11.5–14.5)
GLOBULIN SER-MCNC: 5.3 G/L
GLUCOSE SERPL-MCNC: 322 MG/DL (ref 74–106)
HCT VFR BLD CALC: 26.1 % (ref 42–54)
HGB BLD-MCNC: 8.2 G/DL (ref 13.5–17.5)
IMM GRANULOCYTES NFR BLD: 0.1 % (ref 0–5)
LYMPHOCYTES NFR BLD AUTO: 21 % (ref 15–50)
MAGNESIUM SERPL-MCNC: 1.9 MG/DL (ref 1.8–2.4)
MCH RBC QN AUTO: 26.1 PG (ref 26–34)
MCHC RBC AUTO-ENTMCNC: 31.4 G/DL (ref 31–37)
MCV RBC: 83.1 FL (ref 80–100)
MONOCYTES NFR BLD: 7.6 % (ref 2–11)
NEUTROPHILS NFR BLD AUTO: 68.2 % (ref 40–80)
OSMOLALITY SERPL CALC.SUM OF ELEC: 281 MOSM/KG (ref 275–300)
PLATELET # BLD: 330 10X3/UL (ref 130–400)
PMV BLD AUTO: 9 FL (ref 7.4–10.4)
POTASSIUM SERPL-SCNC: 4 MMOL/L (ref 3.5–5.1)
PROT SERPL-MCNC: 7.2 G/DL (ref 6.4–8.2)
RBC # BLD AUTO: 3.14 10X6/UL (ref 4.2–6.1)
SODIUM SERPL-SCNC: 136 MMOL/L (ref 136–145)
WBC # BLD AUTO: 8.4 10X3/UL (ref 4.8–10.8)

## 2018-12-02 VITALS — SYSTOLIC BLOOD PRESSURE: 150 MMHG | DIASTOLIC BLOOD PRESSURE: 71 MMHG

## 2018-12-02 VITALS — SYSTOLIC BLOOD PRESSURE: 138 MMHG | DIASTOLIC BLOOD PRESSURE: 72 MMHG

## 2018-12-02 VITALS — DIASTOLIC BLOOD PRESSURE: 78 MMHG | SYSTOLIC BLOOD PRESSURE: 153 MMHG

## 2018-12-02 VITALS — SYSTOLIC BLOOD PRESSURE: 133 MMHG | DIASTOLIC BLOOD PRESSURE: 69 MMHG

## 2018-12-02 VITALS — DIASTOLIC BLOOD PRESSURE: 73 MMHG | SYSTOLIC BLOOD PRESSURE: 140 MMHG

## 2018-12-02 VITALS — DIASTOLIC BLOOD PRESSURE: 75 MMHG | SYSTOLIC BLOOD PRESSURE: 141 MMHG

## 2018-12-02 LAB
ANION GAP SERPL CALC-SCNC: 9.5 MMOL/L (ref 8–16)
BASOPHILS NFR BLD AUTO: 0.4 % (ref 0–2)
BUN SERPL-MCNC: 8 MG/DL (ref 7–18)
CALCIUM SERPL-MCNC: 8.4 MG/DL (ref 8.5–10.1)
CHLORIDE SERPL-SCNC: 100 MMOL/L (ref 98–107)
CO2 SERPL-SCNC: 29.2 MMOL/L (ref 21–32)
CREAT SERPL-MCNC: 0.9 MG/DL (ref 0.6–1.3)
EOSINOPHIL NFR BLD: 3 % (ref 0–7)
ERYTHROCYTE [DISTWIDTH] IN BLOOD BY AUTOMATED COUNT: 14 % (ref 11.5–14.5)
GLUCOSE SERPL-MCNC: 339 MG/DL (ref 74–106)
HCT VFR BLD CALC: 27.2 % (ref 42–54)
HGB BLD-MCNC: 8.8 G/DL (ref 13.5–17.5)
IMM GRANULOCYTES NFR BLD: 0.1 % (ref 0–5)
LYMPHOCYTES NFR BLD AUTO: 20.6 % (ref 15–50)
MCH RBC QN AUTO: 26.9 PG (ref 26–34)
MCHC RBC AUTO-ENTMCNC: 32.4 G/DL (ref 31–37)
MCV RBC: 83.2 FL (ref 80–100)
MONOCYTES NFR BLD: 6.7 % (ref 2–11)
NEUTROPHILS NFR BLD AUTO: 69.2 % (ref 40–80)
OSMOLALITY SERPL CALC.SUM OF ELEC: 280 MOSM/KG (ref 275–300)
PLATELET # BLD: 380 10X3/UL (ref 130–400)
PMV BLD AUTO: 9.2 FL (ref 7.4–10.4)
POTASSIUM SERPL-SCNC: 3.7 MMOL/L (ref 3.5–5.1)
RBC # BLD AUTO: 3.27 10X6/UL (ref 4.2–6.1)
SODIUM SERPL-SCNC: 135 MMOL/L (ref 136–145)
WBC # BLD AUTO: 7.4 10X3/UL (ref 4.8–10.8)

## 2018-12-03 VITALS — DIASTOLIC BLOOD PRESSURE: 70 MMHG | SYSTOLIC BLOOD PRESSURE: 134 MMHG

## 2018-12-03 VITALS — DIASTOLIC BLOOD PRESSURE: 77 MMHG | SYSTOLIC BLOOD PRESSURE: 150 MMHG

## 2018-12-03 VITALS — SYSTOLIC BLOOD PRESSURE: 147 MMHG | DIASTOLIC BLOOD PRESSURE: 69 MMHG

## 2018-12-03 VITALS — DIASTOLIC BLOOD PRESSURE: 79 MMHG | SYSTOLIC BLOOD PRESSURE: 147 MMHG

## 2018-12-03 VITALS — SYSTOLIC BLOOD PRESSURE: 155 MMHG | DIASTOLIC BLOOD PRESSURE: 75 MMHG

## 2019-01-09 ENCOUNTER — HOSPITAL ENCOUNTER (OUTPATIENT)
Dept: HOSPITAL 84 - D.LABREF | Age: 64
Discharge: HOME | End: 2019-01-09
Attending: INTERNAL MEDICINE
Payer: MEDICARE

## 2019-01-09 VITALS — BODY MASS INDEX: 23.6 KG/M2

## 2019-01-09 DIAGNOSIS — M86.9: Primary | ICD-10-CM

## 2019-01-09 LAB
BASOPHILS NFR BLD AUTO: 0.4 % (ref 0–2)
BUN SERPL-MCNC: 10 MG/DL (ref 7–18)
CREAT SERPL-MCNC: 1 MG/DL (ref 0.6–1.3)
CRP SERPL-MCNC: 6 MG/DL (ref 0–0.9)
EOSINOPHIL NFR BLD: 3.3 % (ref 0–7)
ERYTHROCYTE [DISTWIDTH] IN BLOOD BY AUTOMATED COUNT: 16.6 % (ref 11.5–14.5)
ERYTHROCYTE [SEDIMENTATION RATE] IN BLOOD: 56 MM/HR (ref 0–20)
HCT VFR BLD CALC: 36.5 % (ref 42–54)
HGB BLD-MCNC: 11.8 G/DL (ref 13.5–17.5)
IMM GRANULOCYTES NFR BLD: 0.3 % (ref 0–5)
LYMPHOCYTES NFR BLD AUTO: 24.3 % (ref 15–50)
MCH RBC QN AUTO: 26.6 PG (ref 26–34)
MCHC RBC AUTO-ENTMCNC: 32.3 G/DL (ref 31–37)
MCV RBC: 82.4 FL (ref 80–100)
MONOCYTES NFR BLD: 7.5 % (ref 2–11)
NEUTROPHILS NFR BLD AUTO: 64.2 % (ref 40–80)
PLATELET # BLD: 320 10X3/UL (ref 130–400)
PMV BLD AUTO: 9.5 FL (ref 7.4–10.4)
RBC # BLD AUTO: 4.43 10X6/UL (ref 4.2–6.1)
WBC # BLD AUTO: 9.6 10X3/UL (ref 4.8–10.8)

## 2019-02-12 ENCOUNTER — HOSPITAL ENCOUNTER (OUTPATIENT)
Dept: HOSPITAL 84 - D.LABREF | Age: 64
Discharge: HOME | End: 2019-02-12
Attending: INTERNAL MEDICINE
Payer: SELF-PAY

## 2019-02-12 VITALS — BODY MASS INDEX: 23.6 KG/M2

## 2019-02-12 DIAGNOSIS — M86.8X7: Primary | ICD-10-CM

## 2019-02-12 LAB
BASOPHILS NFR BLD AUTO: 0.3 % (ref 0–2)
BUN SERPL-MCNC: 15 MG/DL (ref 7–18)
CRP SERPL-MCNC: 0.8 MG/DL (ref 0–0.9)
EOSINOPHIL NFR BLD: 2.4 % (ref 0–7)
ERYTHROCYTE [DISTWIDTH] IN BLOOD BY AUTOMATED COUNT: 18.6 % (ref 11.5–14.5)
ERYTHROCYTE [SEDIMENTATION RATE] IN BLOOD: 28 MM/HR (ref 0–20)
HCT VFR BLD CALC: 38.1 % (ref 42–54)
HGB BLD-MCNC: 12.4 G/DL (ref 13.5–17.5)
IMM GRANULOCYTES NFR BLD: 0.3 % (ref 0–5)
LYMPHOCYTES NFR BLD AUTO: 23 % (ref 15–50)
MCH RBC QN AUTO: 27.6 PG (ref 26–34)
MCHC RBC AUTO-ENTMCNC: 32.5 G/DL (ref 31–37)
MCV RBC: 84.9 FL (ref 80–100)
MONOCYTES NFR BLD: 8.5 % (ref 2–11)
NEUTROPHILS NFR BLD AUTO: 65.5 % (ref 40–80)
PLATELET # BLD: 256 10X3/UL (ref 130–400)
PMV BLD AUTO: 10 FL (ref 7.4–10.4)
RBC # BLD AUTO: 4.49 10X6/UL (ref 4.2–6.1)
WBC # BLD AUTO: 10.3 10X3/UL (ref 4.8–10.8)

## 2019-02-27 ENCOUNTER — HOSPITAL ENCOUNTER (OUTPATIENT)
Dept: HOSPITAL 84 - D.LABREF | Age: 64
Discharge: HOME | End: 2019-02-27
Attending: INTERNAL MEDICINE
Payer: MEDICARE

## 2019-02-27 VITALS — BODY MASS INDEX: 23.6 KG/M2

## 2019-02-27 DIAGNOSIS — M86.9: Primary | ICD-10-CM

## 2019-02-27 LAB
BASOPHILS NFR BLD AUTO: 0.4 % (ref 0–2)
EOSINOPHIL NFR BLD: 3 % (ref 0–7)
ERYTHROCYTE [DISTWIDTH] IN BLOOD BY AUTOMATED COUNT: 18.9 % (ref 11.5–14.5)
ERYTHROCYTE [SEDIMENTATION RATE] IN BLOOD: 1 MM/HR (ref 0–20)
HCT VFR BLD CALC: 36 % (ref 42–54)
HGB BLD-MCNC: 11.7 G/DL (ref 13.5–17.5)
IMM GRANULOCYTES NFR BLD: 0.2 % (ref 0–5)
LYMPHOCYTES NFR BLD AUTO: 24.8 % (ref 15–50)
MCH RBC QN AUTO: 27.7 PG (ref 26–34)
MCHC RBC AUTO-ENTMCNC: 32.5 G/DL (ref 31–37)
MCV RBC: 85.3 FL (ref 80–100)
MONOCYTES NFR BLD: 8.2 % (ref 2–11)
NEUTROPHILS NFR BLD AUTO: 63.4 % (ref 40–80)
PLATELET # BLD: 252 10X3/UL (ref 130–400)
PMV BLD AUTO: 10 FL (ref 7.4–10.4)
RBC # BLD AUTO: 4.22 10X6/UL (ref 4.2–6.1)
WBC # BLD AUTO: 8 10X3/UL (ref 4.8–10.8)

## 2019-09-25 ENCOUNTER — HOSPITAL ENCOUNTER (OUTPATIENT)
Dept: HOSPITAL 84 - D.LAB | Age: 64
Discharge: HOME | End: 2019-09-25
Attending: FAMILY MEDICINE
Payer: MEDICARE

## 2019-09-25 VITALS — BODY MASS INDEX: 23.6 KG/M2

## 2019-09-25 DIAGNOSIS — E11.9: Primary | ICD-10-CM

## 2019-09-25 DIAGNOSIS — I73.9: ICD-10-CM

## 2019-09-25 DIAGNOSIS — I25.10: ICD-10-CM

## 2019-09-25 LAB
ALBUMIN SERPL-MCNC: 3.6 G/DL (ref 3.4–5)
ALP SERPL-CCNC: 110 U/L (ref 46–116)
ALT SERPL-CCNC: 27 U/L (ref 10–68)
AMORPHOUS SEDIMENT: (no result) /LPF
ANION GAP SERPL CALC-SCNC: 12.4 MMOL/L (ref 8–16)
APPEARANCE UR: (no result)
BACTERIA #/AREA URNS HPF: (no result) /HPF
BASOPHILS NFR BLD AUTO: 0.1 % (ref 0–2)
BILIRUB SERPL-MCNC: 1.28 MG/DL (ref 0.2–1.3)
BILIRUB SERPL-MCNC: NEGATIVE MG/DL
BUN SERPL-MCNC: 17 MG/DL (ref 7–18)
CALCIUM SERPL-MCNC: 9.1 MG/DL (ref 8.5–10.1)
CHLORIDE SERPL-SCNC: 101 MMOL/L (ref 98–107)
CHOLEST/HDLC SERPL: 4.3 RATIO (ref 2.3–4.9)
CO2 SERPL-SCNC: 29.3 MMOL/L (ref 21–32)
COLOR UR: YELLOW
CREAT SERPL-MCNC: 1 MG/DL (ref 0.6–1.3)
EOSINOPHIL NFR BLD: 1.3 % (ref 0–7)
ERYTHROCYTE [DISTWIDTH] IN BLOOD BY AUTOMATED COUNT: 13.2 % (ref 11.5–14.5)
EST. AVERAGE GLUCOSE BLD GHB EST-MCNC: 192 MG/DL (ref 74–154)
GLOBULIN SER-MCNC: 4 G/L
GLUCOSE SERPL-MCNC: 144 MG/DL (ref 74–106)
GLUCOSE SERPL-MCNC: 50 MG/DL
HCT VFR BLD CALC: 41.8 % (ref 42–54)
HDLC SERPL-MCNC: 26 MG/DL (ref 32–96)
HGB BLD-MCNC: 15.2 G/DL (ref 13.5–17.5)
IMM GRANULOCYTES NFR BLD: 0.2 % (ref 0–5)
KETONES UR STRIP-MCNC: (no result) MG/DL
LDL-HDL RATIO: 2.6 RATIO (ref 1.5–3.5)
LDLC SERPL-MCNC: 67 MG/DL (ref 0–100)
LYMPHOCYTES NFR BLD AUTO: 14.7 % (ref 15–50)
MCH RBC QN AUTO: 32.8 PG (ref 26–34)
MCHC RBC AUTO-ENTMCNC: 36.4 G/DL (ref 31–37)
MCV RBC: 90.3 FL (ref 80–100)
MONOCYTES NFR BLD: 7.6 % (ref 2–11)
MUCOUS THREADS #/AREA URNS LPF: (no result) /LPF
NEUTROPHILS NFR BLD AUTO: 76.1 % (ref 40–80)
NITRITE UR-MCNC: POSITIVE MG/ML
OSMOLALITY SERPL CALC.SUM OF ELEC: 282 MOSM/KG (ref 275–300)
PH UR STRIP: 5 [PH] (ref 5–6)
PLATELET # BLD: 197 10X3/UL (ref 130–400)
PMV BLD AUTO: 10.9 FL (ref 7.4–10.4)
POTASSIUM SERPL-SCNC: 3.7 MMOL/L (ref 3.5–5.1)
PROT SERPL-MCNC: 7.6 G/DL (ref 6.4–8.2)
PROT UR-MCNC: (no result) MG/DL
RBC # BLD AUTO: 4.63 10X6/UL (ref 4.2–6.1)
RBC #/AREA URNS HPF: (no result) /HPF (ref 0–5)
SODIUM SERPL-SCNC: 139 MMOL/L (ref 136–145)
SP GR UR STRIP: 1.01 (ref 1–1.02)
SQUAMOUS #/AREA URNS HPF: (no result) /HPF (ref 0–5)
TRIGL SERPL-MCNC: 94 MG/DL (ref 30–200)
TSH SERPL-ACNC: 1.18 UIU/ML (ref 0.36–3.74)
UROBILINOGEN UR-MCNC: NORMAL MG/DL
WBC # BLD AUTO: 13.4 10X3/UL (ref 4.8–10.8)
WBC #/AREA URNS HPF: (no result) /HPF

## 2019-09-27 ENCOUNTER — HOSPITAL ENCOUNTER (OUTPATIENT)
Dept: HOSPITAL 84 - D.LABREF | Age: 64
Discharge: HOME | End: 2019-09-27
Attending: PODIATRIST
Payer: MEDICARE

## 2019-09-27 VITALS — BODY MASS INDEX: 23.6 KG/M2

## 2019-09-27 DIAGNOSIS — L03.116: Primary | ICD-10-CM

## 2019-11-21 ENCOUNTER — HOSPITAL ENCOUNTER (INPATIENT)
Dept: HOSPITAL 84 - D.MS | Age: 64
LOS: 14 days | Discharge: HOME HEALTH | DRG: 41 | End: 2019-12-05
Attending: ORTHOPAEDIC SURGERY | Admitting: ORTHOPAEDIC SURGERY
Payer: MEDICARE

## 2019-11-21 VITALS
BODY MASS INDEX: 24.91 KG/M2 | HEIGHT: 70 IN | BODY MASS INDEX: 24.91 KG/M2 | WEIGHT: 174 LBS | HEIGHT: 70 IN | WEIGHT: 174 LBS | BODY MASS INDEX: 24.91 KG/M2 | BODY MASS INDEX: 24.91 KG/M2

## 2019-11-21 DIAGNOSIS — E11.51: ICD-10-CM

## 2019-11-21 DIAGNOSIS — I25.10: ICD-10-CM

## 2019-11-21 DIAGNOSIS — E11.69: ICD-10-CM

## 2019-11-21 DIAGNOSIS — M86.672: ICD-10-CM

## 2019-11-21 DIAGNOSIS — L03.116: ICD-10-CM

## 2019-11-21 DIAGNOSIS — N40.0: ICD-10-CM

## 2019-11-21 DIAGNOSIS — I10: ICD-10-CM

## 2019-11-21 DIAGNOSIS — K21.9: ICD-10-CM

## 2019-11-21 DIAGNOSIS — G25.81: ICD-10-CM

## 2019-11-21 DIAGNOSIS — E11.610: Primary | ICD-10-CM

## 2019-12-02 VITALS — SYSTOLIC BLOOD PRESSURE: 109 MMHG | DIASTOLIC BLOOD PRESSURE: 48 MMHG

## 2019-12-02 VITALS — SYSTOLIC BLOOD PRESSURE: 136 MMHG | DIASTOLIC BLOOD PRESSURE: 73 MMHG

## 2019-12-02 VITALS — SYSTOLIC BLOOD PRESSURE: 140 MMHG | DIASTOLIC BLOOD PRESSURE: 72 MMHG

## 2019-12-02 VITALS — SYSTOLIC BLOOD PRESSURE: 145 MMHG | DIASTOLIC BLOOD PRESSURE: 71 MMHG

## 2019-12-02 LAB
ANION GAP SERPL CALC-SCNC: 6 MMOL/L (ref 8–16)
BUN SERPL-MCNC: 14 MG/DL (ref 7–18)
CALCIUM SERPL-MCNC: 8.9 MG/DL (ref 8.5–10.1)
CHLORIDE SERPL-SCNC: 103 MMOL/L (ref 98–107)
CO2 SERPL-SCNC: 35.7 MMOL/L (ref 21–32)
CREAT SERPL-MCNC: 1.1 MG/DL (ref 0.6–1.3)
ERYTHROCYTE [DISTWIDTH] IN BLOOD BY AUTOMATED COUNT: 14.8 % (ref 11.5–14.5)
GLUCOSE SERPL-MCNC: 122 MG/DL (ref 74–106)
HCT VFR BLD CALC: 33.6 % (ref 42–54)
HGB BLD-MCNC: 10.6 G/DL (ref 13.5–17.5)
MCH RBC QN AUTO: 27.8 PG (ref 26–34)
MCHC RBC AUTO-ENTMCNC: 31.5 G/DL (ref 31–37)
MCV RBC: 88.2 FL (ref 80–100)
OSMOLALITY SERPL CALC.SUM OF ELEC: 282 MOSM/KG (ref 275–300)
PLATELET # BLD: 356 10X3/UL (ref 130–400)
PMV BLD AUTO: 9.2 FL (ref 7.4–10.4)
POTASSIUM SERPL-SCNC: 3.7 MMOL/L (ref 3.5–5.1)
RBC # BLD AUTO: 3.81 10X6/UL (ref 4.2–6.1)
SODIUM SERPL-SCNC: 141 MMOL/L (ref 136–145)
WBC # BLD AUTO: 11.7 10X3/UL (ref 4.8–10.8)

## 2019-12-02 PROCEDURE — 0Y6J0Z2 DETACHMENT AT LEFT LOWER LEG, MID, OPEN APPROACH: ICD-10-PCS | Performed by: ORTHOPAEDIC SURGERY

## 2019-12-02 NOTE — NUR
REC'D TO ROOM 2217 AT THIS TIME FROM THE RECOVERY SUITE. RESP EVEN AND
UNLABORED WITH NO DISTRESS NOTED. CAN EXPRESS NEEDS AND WANTS. NO C/O AT THIS
TIME OF PAIN OR DISCOMFORT. ACE WRAP NOTED TO LEFT BELOW KNEE AMPUTATION. IV
INFUSING AT THIS TIME. FAMILY AND C/L IN REACH AT BEDSIDE.

## 2019-12-02 NOTE — NUR
ASSESSMENT AS PER FLOWSHEET. IV PATENT LEFT WRIST OF NS AT 50CC'S/HR. O2 ON
2L/M PER NC. CONNECTED TO FLOWMETER. PCA OF DILAUDID WITH SETTINGS AT 0.2MG
Q10MIN W/4MG Q4H L/O. DRESSING TO LT STUMP C/D/I. OLD RT AKA.

## 2019-12-02 NOTE — NUR
PCA STARTED AT THIS TIME CHECK VIA TWO NURSE. C/O PAIN RATING 9/10 ON PAIN
SCALE. C/L IN REACH AT BEDSIDE.

## 2019-12-03 VITALS — DIASTOLIC BLOOD PRESSURE: 43 MMHG | SYSTOLIC BLOOD PRESSURE: 129 MMHG

## 2019-12-03 VITALS — SYSTOLIC BLOOD PRESSURE: 136 MMHG | DIASTOLIC BLOOD PRESSURE: 65 MMHG

## 2019-12-03 VITALS — DIASTOLIC BLOOD PRESSURE: 59 MMHG | SYSTOLIC BLOOD PRESSURE: 132 MMHG

## 2019-12-03 VITALS — DIASTOLIC BLOOD PRESSURE: 60 MMHG | SYSTOLIC BLOOD PRESSURE: 128 MMHG

## 2019-12-03 VITALS — SYSTOLIC BLOOD PRESSURE: 120 MMHG | DIASTOLIC BLOOD PRESSURE: 62 MMHG

## 2019-12-03 VITALS — DIASTOLIC BLOOD PRESSURE: 63 MMHG | SYSTOLIC BLOOD PRESSURE: 154 MMHG

## 2019-12-03 LAB
HCT VFR BLD CALC: 31.7 % (ref 42–54)
HGB BLD-MCNC: 9.6 G/DL (ref 13.5–17.5)

## 2019-12-03 NOTE — NUR
PATIENT IN BED WITH IV INTACT. NO COMPLAINTS OR SIGNS OF DISTRESS. LEFT STUMP
DRESSING CDI. CALL LIGHT WITHIN REACH. WILL CONTINUE TO MONITOR.

## 2019-12-03 NOTE — NUR
PT SITTING UP IN BED WITHOUT DISTRESS, AOX4. IV LEFT FA INFUSING 1/2NS @ 50.
DILAUDID PCA IN USE. PT STATES NO PAIN. DENIES NEEDS. CL IN REACH, WILL CTM

## 2019-12-03 NOTE — NUR
PATIENT BS READING 585 X 2. PATIENT STATED HE DIDNT KNOW WHY IT WOULD BE SO
HIGH HE DIDNT EAT ANYTHING. WILL PUT IN STAT LAB.

## 2019-12-03 NOTE — OP
PATIENT NAME:  GABI JOSEPH                             MEDICAL RECORD: [id number]
:59                                             LOCATION:[id number]
                                                         ADMISSION DATE:19
SURGEON:  REMI MORALES MD        
 
 
DATE OF OPERATION:  2019
 
PREOPERATIVE DIAGNOSIS:  Chronic osteomyelitis of the left foot and ankle.
 
POSTOPERATIVE DIAGNOSIS:  Chronic osteomyelitis of the left foot and ankle.
 
PROCEDURE:  Left below-knee amputation.
 
SURGEON:  Remi Morales MD
 
FIRST ASSISTANT:  Noé Lassiter.
 
INTRAOPERATIVE COMPLICATIONS:  Essentially none.
 
SUMMARY OF PATHOLOGIC FINDINGS:  The portions of the posterior flap did appear
to have some mild necrosis; however, they remain contractile and the posterior
flap utilized for the BKA did appear to be viable with adequate bleeding.
 
OPERATIVE SUMMARY IN DETAIL:  After obtaining the appropriate preoperative
orthopedic surgery consent as well as anesthetic consultation, evaluation and
clearance, the patient was taken to the operating room and placed on operating
table in supine position.  After adequate perioperative timeout was taken and
agreed upon by all, tourniquet was placed about the proximal aspect of left
lower extremity.  Left lower extremity was then prepped and draped in routine
sterile fashion.  The osteomyelitic foot was isolated with an impervious
stockinette.  The leg was then elevated, not exsanguinated.  Tourniquet was
inflated to 350 mmHg.  Planned incision was drawn with a large posterior flap
given the appropriate amount of residual tibia.  Incision was made, taken down
to the level of the tibia and the fibula.  Then, corticotomy was completed with
the sagittal saw.  Anterior Chevron was created to maintain smoothness of the
anterior BKA.  Having completed this, amputation knife was used to complete the
posterior flap.  The posterior tibial artery was dissected down and was tied off
just above the trifurcation.  At this point, the tourniquet was deflated.  Any
other bleeders were high ligated or cauterized to make this relatively dry. 
Please note that the fibula was osteotomized just proximal to the tibia with a
slight angular cannulation.  After copious irrigation, the flap was brought up
to the anterior periosteum and fascia and sutured with a #1 Vicryl.  This was
followed by #1 Vicryl, 2-0 Vicryl and skin staples.  Sterile dressings were
applied.  Final closure was done by Noé Lassiter.  The patient was then
awakened and taken to recovery room in stable condition.  All final needle and
sponge counts were correct.
 
TRANSINT:[id number] Voice Confirmation ID: [id number] DOCUMENT ID: [id number]
 
 
 
OPERATIVE REPORT                               [id number]    GABI JOSEPH MD, REMI NEWSOME        
 
 
 
Electronically Signed by REMI MORALES MD on 19 at 1142
 
 
 
 
 
 
 
 
 
 
 
 
 
 
 
 
 
 
 
 
 
 
 
 
 
 
 
 
 
 
 
 
 
 
 
 
 
 
 
 
 
CC:                                                             [id number]
DICTATION DATE: 1933     :     19 1038      ADM IN  
                                                                              
[facility]                                          
[address] [place] [address]

## 2019-12-04 VITALS — DIASTOLIC BLOOD PRESSURE: 75 MMHG | SYSTOLIC BLOOD PRESSURE: 156 MMHG

## 2019-12-04 VITALS — DIASTOLIC BLOOD PRESSURE: 61 MMHG | SYSTOLIC BLOOD PRESSURE: 123 MMHG

## 2019-12-04 VITALS — SYSTOLIC BLOOD PRESSURE: 148 MMHG | DIASTOLIC BLOOD PRESSURE: 70 MMHG

## 2019-12-04 VITALS — SYSTOLIC BLOOD PRESSURE: 135 MMHG | DIASTOLIC BLOOD PRESSURE: 63 MMHG

## 2019-12-04 VITALS — SYSTOLIC BLOOD PRESSURE: 154 MMHG | DIASTOLIC BLOOD PRESSURE: 86 MMHG

## 2019-12-04 VITALS — SYSTOLIC BLOOD PRESSURE: 160 MMHG | DIASTOLIC BLOOD PRESSURE: 74 MMHG

## 2019-12-04 LAB
HCT VFR BLD CALC: 30.2 % (ref 42–54)
HGB BLD-MCNC: 9.3 G/DL (ref 13.5–17.5)

## 2019-12-05 VITALS — DIASTOLIC BLOOD PRESSURE: 54 MMHG | SYSTOLIC BLOOD PRESSURE: 99 MMHG

## 2019-12-05 VITALS — DIASTOLIC BLOOD PRESSURE: 88 MMHG | SYSTOLIC BLOOD PRESSURE: 174 MMHG

## 2019-12-05 VITALS — DIASTOLIC BLOOD PRESSURE: 61 MMHG | SYSTOLIC BLOOD PRESSURE: 122 MMHG

## 2019-12-05 LAB
HCT VFR BLD CALC: 30.1 % (ref 42–54)
HGB BLD-MCNC: 9.8 G/DL (ref 13.5–17.5)

## 2019-12-05 NOTE — NUR
PT RESTING IN BED. EYES CLOSED. NO SIGNS OF DISTRESS. BREATHING EVEN AND
UNLABORED. IV SITE LT FA DRESSING CLEAN DRY AND INTACT. NO SIGNS OF INFECTION
OR INFULTRATION. BOWEL SOUNDS ACTIVE. LUNG SOUNDS CLEAR. SKIN CLEAN DRY AND
INTACT. RT OLD BKA. LT BKA DRESSING CLEAN DRY AND INTACT. WILL CONTINUE PLAN
OF CARE. CALL LIGHT IN REACH. BED LOWERED AND LOCKED. BED RAILS UPX2.

## 2019-12-05 NOTE — MORECARE
CASE MANAGEMENT DISCHARGE SUMMARY
 
 
PATIENT: GABI JOSEPH                       UNIT: [id number]
ACCOUNT#: [id number]                       ADM DATE: 19
AGE: 64     : 59  SEX: M            ROOM/BED: D.2217    
AUTHOR: ILENEDOC                             PHYSICIAN:                               
 
REFERRING PHYSICIAN: REMI MORALES MD        
DATE OF SERVICE: 19
Discharge Plan
 
 
Patient Name: GABI JOSEPH
Facility: [facility]:[place]
Encounter #: [id number]
Medical Record #: [id number]
: 1955
Planned Disposition: Home with Home Health
Anticipated Discharge Date: 
 
Discharge Date: 
Expected LOS: 
Initial Reviewer: [email or web address]
Initial Review Date: 2019
Generated: 19   1:45 pm 
Comments
 
DCP- Discharge Planning
 
Updated by [email or web address]: Ely Mendiola on 19  11:38 am CT
Patient Name: GABI JOSEPH                                     
Admission Status: Elective   
Accout number: [id number]                              
Admission Date: 2019   
: 1955                                                        
Admission Diagnosis:TYPE 2 DIABETES MELLITUS WITH OTHER SPECIFIED COMPLICAT  
Attending: REMI MORALES                                               
Current LOS:  3   
  
Anticipated DC Date:    
Planned Disposition: Home with Home Health   
Primary Insurance: MEDICARE A & B   
  
  
Discharge Planning Comments:   
  
CM met with patient to complete initial dc planning assessment.  CM educated 
patient on the CM role and verbal consent given by patient to complete 
assessment.   Patient lives at home with his wife where he is independent 
 
with his care.  At discharge patient plans to return home and feels this is a 
safe discharge.  CM discussed availability of home health, rehab services, 
and medical equipment. She has a ramp, shower chair, power chair, ramp, 
prosthetic leg, BSC. He will get [facility]. I have called [facility] and 
they will accept patient. IMM served and explained. RAFA signed and placed in 
chart.  Patient denied known discharge needs at this time. CM will continue 
to follow and will assist as needed with dc plans/needs.    
  
  
  
  
: Ely Mendiola
 DCPIA - Discharge Planning Initial Assessment
 
Updated by [email or web address]: Ely Mendiola on 19  12:29 pm
*  Is the patient Alert and Oriented?
Yes
*  How many steps to enter\exit or inside your home? RAMP *  PCP ARNOLDO *  Pharmacy [facility]
[address]
*  Preadmission Environment
Home with Family
*  ADLs
Independent
*  Equipment
Bedside Commode
Elevated Toliet Seat
Grab Bars
Power Chair or Electric Scooter
Rolling Walker
Shower Chair
Walker
Wheelchair
*  Other Equipment
PROSTHETIC
*  List name and contact numbers for known caregivers / representatives who 
currently or will assist patient after discharge:
PHILLIP ELIZONDO) [phone]
*  Verbal permission to speak to the caregivers and representatives has been 
obtained from the patient.
N/A
*  Community resources currently utilized
None
*  Additional services required to return to the preadmission environment?
Yes
*  Can the patient safely return to the preadmission environment?
Yes
*  Has this patient been hospitalized within the prior 30 days at any 
hospital?
No
 
External Providers
External Provider: [facility]
 
 
Next Contact Date: 
Service Request Date: 
Service Type: 
Resolution: 
 
Reviewer: 
Comments: 
 
 
 
 
 
 
Last DP export: 19  11:31 a
Patient Name: GABI JOSEPH
Encounter #: [id number]
Page [id number]
 
 
 
 
 
Electronically Signed by MALENA ODOM on 19 at 1245
 
 
 
 
 
 
**All edits/amendments must be made on the electronic document**
 
DICTATION DATE: 19 1244     : EDGARDO  19 1244     
RPT#: [id number]                                DC DATE:        
                                               STATUS: ADM IN  
[facility]
[address] [place] [address]
***END OF REPORT***

## 2019-12-05 NOTE — NUR
PT GOR VERY HOT AND NAUSEOUS STATED BLOOD SUGAR IS LOW, GAVE PT APPLE JUICE
AND TURNED ON AIR, NO OTHER NEEDS VOICED, PT STATED HE FELT BETTER CONTINUE
WITH PLAN OF CARE

## 2019-12-05 NOTE — NUR
PT OT HAVE LIMB PROTECTOR PRIOR TO DC PT WAS TIRED OF WAITING FOR SOMEONE AT
[facility] TO BRING AND WANTED TO LEAVE, CALLED [facility] AND WAS ADVISED [facility] IS
STILL WORKING ON IT AND TO DC PT AND THEY WILL PERSONALLY DELIVER TO PT HOME.
PT DC HOME

## 2019-12-05 NOTE — MORECARE
CASE MANAGEMENT DISCHARGE SUMMARY
 
 
PATIENT: GABI JOSEPH                       UNIT: [id number]
ACCOUNT#: [id number]                       ADM DATE: 19
AGE: 64     : 59  SEX: M            ROOM/BED: D.2217    
AUTHOR: MALENA ODOM                             PHYSICIAN:                               
 
REFERRING PHYSICIAN: REMI MORALES MD        
DATE OF SERVICE: 19
Discharge Plan
 
 
Patient Name: GABI JOSEPH
[facility]
Encounter #: [id number]
Medical Record #: [id number]
: 1955
Planned Disposition: Home with Home Health
Anticipated Discharge Date: 
 
Discharge Date: 
Expected LOS: 
Initial Reviewer: [email or web address]
Initial Review Date: 2019
Generated: 19   1:31 pm 
 DCPIA - Discharge Planning Initial Assessment
 
Updated by [email or web address]: Ely Mendiola on 19  12:29 pm
*  Is the patient Alert and Oriented?
Yes
*  How many steps to enter\exit or inside your home? RAMP *  PCP ARNOLDO *  Pharmacy [facility]
*  Preadmission Environment
Home with Family
*  ADLs
Independent
*  Equipment
Bedside Commode
Elevated Toliet Seat
Grab Bars
Power Chair or Electric Scooter
Rolling Walker
Shower Chair
Walker
Wheelchair
*  Other Equipment
PROSTHETIC
*  List name and contact numbers for known caregivers / representatives who 
currently or will assist patient after discharge:
 
PHILLIP (OPAL) [phone]
*  Verbal permission to speak to the caregivers and representatives has been 
obtained from the patient.
N/A
*  Community resources currently utilized
None
*  Additional services required to return to the preadmission environment?
Yes
*  Can the patient safely return to the preadmission environment?
Yes
*  Has this patient been hospitalized within the prior 30 days at any 
hospital?
No
 
 
 
 
 
 
Patient Name: GABI JOSEPH
Encounter #: [id number]
Page [phone]
 
 
 
 
 
Electronically Signed by MALENA ODOM on 19 at 1231
 
 
 
 
 
 
**All edits/amendments must be made on the electronic document**
 
DICTATION DATE: 19 1231     : EDGARDO  19 1231     
RPT#: [id number]                                DC DATE:        
                                               STATUS: ADM IN  
[facility]
[address] [place] [address]
***END OF REPORT***

## 2019-12-09 NOTE — MORECARE
CASE MANAGEMENT DISCHARGE SUMMARY
 
 
PATIENT: GABI JOSEPH                       UNIT: [id number]
ACCOUNT#: [id number]                       ADM DATE: 19
AGE: 64     : 59  SEX: M            ROOM/BED: D.2217    
AUTHOR: MALENA ODOM                             PHYSICIAN:                               
 
REFERRING PHYSICIAN: REMI MORALES MD        
DATE OF SERVICE: 19
Discharge Plan
 
 
Patient Name: GABI JOSEPH
Facility: [facility]:[place]
Encounter #: [id number]
Medical Record #: [id number]
: 1955
Planned Disposition: Home with Home Health
Anticipated Discharge Date: 
 
Discharge Date: 2019
Expected LOS: 
Initial Reviewer: [id number]
Initial Review Date: 2019
Generated: 19   2:53 pm 
Comments
 
DCP- Discharge Planning
 
Updated by [id number]: Ely Mendiola on 19  11:38 am CT
Patient Name: GABI JOSEPH                                     
Admission Status: Elective   
Accout number: [id number]                              
Admission Date: 2019   
: 1955                                                        
Admission Diagnosis:TYPE 2 DIABETES MELLITUS WITH OTHER SPECIFIED COMPLICAT  
Attending: REMI MORALES                                               
Current LOS:  3   
  
Anticipated DC Date:    
Planned Disposition: Home with Home Health   
Primary Insurance: MEDICARE A & B   
  
  
Discharge Planning Comments:   
  
CM met with patient to complete initial dc planning assessment.  CM educated 
patient on the CM role and verbal consent given by patient to complete 
assessment.   Patient lives at home with his wife where he is independent 
 
with his care.  At discharge patient plans to return home and feels this is a 
safe discharge.  CM discussed availability of home health, rehab services, 
and medical equipment. She has a ramp, shower chair, power chair, ramp, 
prosthetic leg, BSC. He will get [facility] home health. I have called [facility] and 
they will accept patient. IMM served and explained. RAFA signed and placed in 
chart.  Patient denied known discharge needs at this time. CM will continue 
to follow and will assist as needed with dc plans/needs.    
  
  
  
  
: Ely Mendiola
 DCPIA - Discharge Planning Initial Assessment
 
Updated by [email or web address]: Ely Mendiola on 19  12:29 pm
*  Is the patient Alert and Oriented?
Yes
*  How many steps to enter\exit or inside your home? RAMP *  PCP ARNOLDO *  Pharmacy [facility]
[address]
*  Preadmission Environment
Home with Family
*  ADLs
Independent
*  Equipment
Bedside Commode
Elevated Toliet Seat
Grab Bars
Power Chair or Electric Scooter
Rolling Walker
Shower Chair
Walker
Wheelchair
*  Other Equipment
PROSTHETIC
*  List name and contact numbers for known caregivers / representatives who 
currently or will assist patient after discharge:
PHILLIP ELIZONDO) [phone]
*  Verbal permission to speak to the caregivers and representatives has been 
obtained from the patient.
N/A
*  Community resources currently utilized
None
*  Additional services required to return to the preadmission environment?
Yes
*  Can the patient safely return to the preadmission environment?
Yes
*  Has this patient been hospitalized within the prior 30 days at any 
hospital?
No
 
 
 
 
 
 
Coverage Notice
 
Reviewer: [email or web address] Mendiola
 
Notice Issued Date-Time: 2019   9:30
Notice Type: IM Discharge Notice
 
Notice Delivered To: Patient
Relationship to Patient: 
Representative Name: 
 
Delivery Method: HAND - Hand Delivered
Amelia Days:
Prior Verbal Notification: 
 
Recipient Understood Notice: Yes
Recipient Signature: Yes
Med Rec Note Co-signed by Attending:
 
Coverage Notice Comment:  
Reviewer: [email or web address] Mendiola
 
Notice Issued Date-Time: 2019   9:30
Notice Type: Patient Choice Letter
 
Notice Delivered To: Patient
Relationship to Patient: 
Representative Name: 
 
Delivery Method: HAND - Hand Delivered
Amelia Days:
Prior Verbal Notification: 
 
Recipient Understood Notice: Yes
Recipient Signature: Yes
Med Rec Note Co-signed by Attending:
 
Coverage Notice Comment:  RAFA WITH [facility]
 
Last DP export: 19  11:45 a
Patient Name: GABI JOSEPH
 
Encounter #: [id number]
Page [phone]
 
 
 
 
 
Electronically Signed by MALENA ODOM on 19 at 1354
 
 
 
 
 
 
**All edits/amendments must be made on the electronic document**
 
DICTATION DATE: 19 1353     : EDGARDO  19 1353     
RPT#: [id number]                                DC DATE:19
                                               STATUS: DIS IN  
[facility]
[address] [place] [address]
***END OF REPORT***